# Patient Record
(demographics unavailable — no encounter records)

---

## 2021-10-15 NOTE — XRAY REPORT
PROCEDURE:  Chest 1 View X-Ray

 

INDICATIONS:  Chest pain

 

TECHNIQUE:  One view of the chest was acquired.  

 

COMPARISON:  None

 

FINDINGS:  

 

Surgical changes and devices:  None.  

 

Lungs and pleura:  No pleural effusions or pneumothorax.  Lungs are clear.  

 

Mediastinum:  Mediastinal contours appear normal.  Heart size is normal.  

 

Bones and chest wall:  No suspicious bony lesions.  Overlying soft tissues appear unremarkable.  

 

IMPRESSION:  

No acute cardiopulmonary pathology.

 

Reviewed by: Farhat Rose MD on 10/15/2021 2:05 PM PDT

Approved by: Farhta Rose MD on 10/15/2021 2:05 PM PDT

 

 

Station ID:  [id number]

## 2021-10-15 NOTE — ED PHYSICIAN DOCUMENTATION
PD HPI CHEST PAIN





- Stated complaint


Stated Complaint: chest px





- Chief complaint


Chief Complaint: Cardiac





- History obtained from


History obtained from: Patient





- History of Present Illness


Timing - onset: How many days ago ( 3 days ago)


Quality: Stabbing





- Additional information


Additional information: 





Pt is 32yo M presenting with chest pain. PMS significant for depression for 

which he takes prozac as well as recent ED evaluation at this facility for URI 

symptoms (9/26) Reports 3 days stabbing chest pain worse with deep inspiration. 

Denies fever, cough, hemoptysis, hospitalizations, travel, hisory of blood 

clots, illicit substance abuse.  





Review of Systems


Constitutional: denies: Fever, Fatigue


Eyes: denies: Loss of vision, Decreased vision


Ears: denies: Loss of hearing


Cardiac: reports: Chest pain / pressure


Respiratory: denies: Dyspnea, Cough, Hemoptysis, Wheezing


GI: denies: Abdominal Pain


Musculoskeletal: denies: Neck pain


Neurologic: denies: Generalized weakness


Psychiatric: reports: Depressed.  denies: Suicidal, Homicidal, Hallucinations, 

Delusions, Anxiety, Insomnia





PD PAST MEDICAL HISTORY





- Past Medical History


Psych: Depression





- Past Surgical History


Past Surgical History: No


HEENT: Tonsil/Adenoidectomy





- Present Medications


Home Medications: 


                                Ambulatory Orders











 Medication  Instructions  Recorded  Confirmed


 


Fluoxetine HCl [Prozac] 40 mg PO DAILY 09/26/21 09/26/21


 


Acetaminophen [Tylenol] 650 mg PO Q6H PRN #30 tablet 10/15/21 


 


Ibuprofen [Motrin] 1 tablet PO Q8H PRN #30 tablet 10/15/21 














- Allergies


Allergies/Adverse Reactions: 


                                    Allergies











Allergy/AdvReac Type Severity Reaction Status Date / Time


 


Penicillins Allergy  Rash Verified 10/15/21 13:36














- Social History


Does the pt smoke?: No


Smoking Status: Never smoker





PD ED PE NORMAL





- General


General: Alert and oriented X 3, No acute distress





- HEENT


HEENT: Atraumatic, EOMI





- Neck


Neck: Supple, no meningeal sign





- Cardiac


Cardiac: RRR, No murmur, No gallop, No rub, Strong equal pulses





- Respiratory


Respiratory: No respiratory distress, Clear bilaterally





- Abdomen


Abdomen: Normal bowel sounds, Non tender





Results





- Vitals


Vitals: 


                               Vital Signs - 24 hr











  10/15/21





  13:33


 


Temperature 36.4 C L


 


Heart Rate 103 H


 


Respiratory 18





Rate 


 


Blood Pressure 150/90 H


 


O2 Saturation 96








                                     Oxygen











O2 Source                      Room air

















- Labs


Labs: 


                                Laboratory Tests











  10/15/21 10/15/21 10/15/21





  14:05 14:05 14:05


 


WBC  9.8  


 


RBC  5.18  


 


Hgb  15.3  


 


Hct  44.6  


 


MCV  86.1  


 


MCH  29.5  


 


MCHC  34.3  


 


RDW  12.8  


 


Plt Count  229  


 


MPV  9.8  


 


Neut # (Auto)  6.9 H  


 


Lymph # (Auto)  1.7  


 


Mono # (Auto)  1.0  


 


Eos # (Auto)  0.1  


 


Baso # (Auto)  0.1  


 


Absolute Nucleated RBC  0.00  


 


Nucleated RBC %  0.0  


 


Sodium   139 


 


Potassium   4.1 


 


Chloride   102 


 


Carbon Dioxide   26 


 


Anion Gap   11.0 


 


BUN   21 H 


 


Creatinine   0.8 


 


Estimated GFR (MDRD)   111 


 


Glucose   83 


 


Calcium   9.3 


 


Total Bilirubin   0.8 


 


AST   22 


 


ALT   26 


 


Alkaline Phosphatase   41 L 


 


Troponin I High Sens    < 2.3 L


 


Total Protein   7.8 


 


Albumin   4.4 


 


Globulin   3.4 


 


Albumin/Globulin Ratio   1.3 


 


Lipase   31 














PD MEDICAL DECISION MAKING





- ED course


Complexity details: reviewed old records, reviewed results, re-evaluated 

patient, considered differential


ED course: 


Pt presented with 3 days of chest pain made worse with deep inspiration. A 

febrile, hemodynamically stable on arrivle. Vitals WNL and non toxic on exam, 

WELLS and PERK Negative. EKG without indication of acute cardiac ischemia. CXR 

non acute. Troponin negative. Pt re-evaluated and found to be resting 

comfortably and in no acute distress. Will discharge with course of Tylenol and 

ibuprofen. Encouraged followup with PCP. Otherwise clear return precautions and 

followup instructions given prior to discharge. 








Departure





- Departure


Disposition: 01 Home, Self Care


Clinical Impression: 


 Chest pain, Pleurisy





Condition: Good


Instructions:  ED Chest Pain NonCardiac, ED Chest Pain Pleurisy


Prescriptions: 


Ibuprofen [Motrin] 1 tablet PO Q8H PRN #30 tablet


 PRN Reason: PAIN &/OR FEVER


Acetaminophen [Tylenol] 650 mg PO Q6H PRN #30 tablet


 PRN Reason: PRN PAIN &/OR FEVER


Comments: 


Thank you for allowing us to care for you at [facility]. 





All of the testing performed today including your EKG, blood tests and chest 

xray were all very reassuring. I would like you to use alternating tylenol and 

ibuprofen at home for pain control. Please drink plenty of liquid and get 

pleanty of rest over the next few days. Please follow up with your PCP as soon 

as able. If at anytime you have any new or worsening symptoms please do not 

hesitate to return to the emergency department.

## 2021-11-22 NOTE — ED PHYSICIAN DOCUMENTATION
PD HPI FOCAL NEURO





- Stated complaint


Stated Complaint: LEFT HAND NUMBNESS





- Chief complaint


Chief Complaint: Neuro





- History obtained from


History obtained from: Patient





- History of Present Illness


Timing - onset: Enter  time (19:00), Today


Timing - details: Gradual onset


Severity of deficit: Mild


Weakness: Hand, Left


Numbness: Hand, Left


Associated symptoms: No: Headache, Nausea / vomiting, Seizure, Syncope, Fall, 

Head injury, Chest pain, Neck pain, Back pain, Fever


Contributing factors: negative: Anticoagulated, Vascular dz, Atrial 

fibrillation, Prosthetic heart valve


Baseline status: positive: A&OX3, ambulatory, indep


Similar symptoms before: Has not had sx before





- Additional information


Additional information: 





c/o gradual onset left hand paresthesias of thumb and 2nd finger, weakness 

predominantly of thumb. Onset approximately 7 PM tonight and increased to the 

point of having difficulty holding a book he was reading. The paresthesia/b

urning sensation has subsequently been radiating up to forearm and as high as 

axilla (left). denies h/o similar symptoms, denies neck pain. He is right hand 

dominant. he had an influenza vaccination earlier today. 





Review of Systems


Constitutional: reports: Reviewed and negative


Eyes: reports: Reviewed and negative


Cardiac: reports: Reviewed and negative


Respiratory: reports: Reviewed and negative


Skin: denies: Rash


Musculoskeletal: denies: Neck pain, Back pain, Extremity pain, Joint pain, 

Extremity swelling, Joint swelling


Neurologic: reports: Focal weakness (limited to thumb (left)), Numbness (paresth

esias but not doug numbness).  denies: Generalized weakness, Headache





PD PAST MEDICAL HISTORY





- Past Medical History


Past Medical History: No


Psych: Depression





- Past Surgical History


Past Surgical History: No


HEENT: Tonsil/Adenoidectomy





- Present Medications


Home Medications: 


                                Ambulatory Orders











 Medication  Instructions  Recorded  Confirmed


 


Fluoxetine HCl [Prozac] 40 mg PO DAILY 09/26/21 09/26/21


 


Acetaminophen [Tylenol] 650 mg PO Q6H PRN #30 tablet 10/15/21 


 


Ibuprofen [Motrin] 1 tablet PO Q8H PRN #30 tablet 10/15/21 














- Allergies


Allergies/Adverse Reactions: 


                                    Allergies











Allergy/AdvReac Type Severity Reaction Status Date / Time


 


Penicillins Allergy  Rash Verified 11/22/21 20:51














- Social History


Does the pt smoke?: No


Smoking Status: Never smoker





PD ED PE NORMAL





- Vitals


Vital signs reviewed: Yes





- General


General: Alert and oriented X 3, No acute distress, Well developed/nourished





- HEENT


HEENT: PERRL, EOMI





- Derm


Derm: Normal color, Warm and dry, No rash





- Extremities


Extremities: No deformity, No tenderness to palpate, Normal ROM s pain, No edema





- Neuro


Neuro: Alert and oriented X 3, No sensory deficit, Other (weakness of left thumb

 extension (3/5) but 5/5 flexion and 4/5 abduction. no atrophy noted left hand)





NIHSS





- Level of Consciousness


Level of consciousness: (0) Alert, Keenly responsive


LOC Questions: (0) Answers both Q's correct


LOC Commands: (0) Performs both correctly





- Gaze


Best Gaze: (0) Normal





- Visual


Visual: (0) No loss





- Facial Palsy


Facial Palsy: (0) Normal, symmetrical movement





- Motor Arms (both separate)


Motor Arm (right): (0) No drift


Motor Arm (left): (0) No drift





- Motor Legs (both separate)


Motor Leg (right): (0) No drift


Motor Leg (left): (0) No drift





- Limb Ataxia


Limb Ataxia: (0) Absent





- Sensory


Sensory: (0) Normal





- Best Language


Best Language: (0) No aphasia





- Dysarthria


Dysarthria: (0) Normal





- Extinction and Inattention (formally neg


Extinction and inattention: (0) No abnormality





- Total Score/Results


Total Score/Result: 0





Results





- Vitals


Vitals: 


                                     Oxygen











O2 Source                      Room air

















- Labs


Labs: 


                                Laboratory Tests











  11/22/21 11/22/21





  21:20 21:20


 


WBC  9.8 


 


RBC  5.39 


 


Hgb  15.9 


 


Hct  46.9 


 


MCV  87.0 


 


MCH  29.5 


 


MCHC  33.9 


 


RDW  13.0 


 


Plt Count  205 


 


MPV  9.7 


 


Neut # (Auto)  6.3 


 


Lymph # (Auto)  2.4 


 


Mono # (Auto)  0.9 


 


Eos # (Auto)  0.2 


 


Baso # (Auto)  0.1 


 


Absolute Nucleated RBC  0.00 


 


Nucleated RBC %  0.0 


 


Sodium   139


 


Potassium   4.1


 


Chloride   100 L


 


Carbon Dioxide   30


 


Anion Gap   9.0


 


BUN   25 H


 


Creatinine   0.8


 


Estimated GFR (MDRD)   111


 


Glucose   92


 


Calcium   9.6














- Rads (name of study)


  ** CTA head


Radiology: Prelim report reviewed, See rad report





  ** CTA neck


Radiology: Prelim report reviewed, See rad report





PD MEDICAL DECISION MAKING





- ED course


Complexity details: reviewed results, re-evaluated patient, considered 

differential, d/w patient


ED course: 





presents with left hand paresthesias and weakness. He indicates roughly equal 

LTS on hands on NIHSS exam. He does exhibit weakness of thumb extension though 

not with abduction. His NIHSS score is zero (no weakness as indicated by lack of

 drift on extremity exams). he also indicates episodic cramping sensation in 

1st and 2nd webspaces, as well as difficulty holding a book due to paresthesias 

and thumb weakness. He also describes paresthesia and mild burning sensation 

traveling up from hand to elbow and as high as axilla. He received a flu shot 

earlier today which is likely coincident (asymetric nature of c/o makes GBS 

unlikely). His symptoms are predominantly limited to median nerve distribution ;

 I suspect median or radial nerve palsy, possibly entrapment such as carpal 

tunnel syndrome. His tests tonight are unremarkable including CTA head and neck.

 Reviewed test results with patient as well as my suspected diagnoses. He will 

likely need further testing in outpatient setting unless symptoms resolve, and 

instructed to return if worse or new and concerning signs/symptoms develop. 





Departure





- Departure


Disposition: 01 Home, Self Care


Clinical Impression: 


 Paresthesia





Condition: Good


Instructions:  ED Carpal Tunnel, ED Paraesthesias


Follow-Up: 


SILVIA ROMO DO [Primary Care Provider] - 


Comments: 


I am providing you with discharge instructions on carpal tunnel syndrome because

your symptoms have some similarities to those that would be typical for carpal 

tunnel syndrome. However, there are other elements in your description that 

would be atypical, and thus this is not a final diagnosis. Follow up with your 

primary care provider; further testing and specialist referral (such as a 

neurologist) might be needed to achieve a definitive diagnosis.


Discharge Date/Time: 11/22/21 22:58

## 2021-11-22 NOTE — CT REPORT
PROCEDURE:  ANGIO HEAD W/WO

 

INDICATIONS:  left hand weakness, paresthesias

 

CONTRAST:  IV CONTRAST: Optiray 320 ml: 80 PO CONTRAST: *NO PO CONTRAST 

 

TECHNIQUE:  

Precontrast 4.5 mm thick angled axial sections acquired from the foramen magnum to the vertex.   Afte
r the administration of intravenous contrast, 1 mm thick sections acquired through the [place]
is.  Postcontrast 4.5 mm thick sections then re-acquired from the foramen magnum to the vertex.  3-di
mensional maximum-intensity-projection (MIP) and/or volume rendering reformats were acquired of the c
entral intracranial vasculature.  For radiation dose reduction, the following was used:  automated ex
posure control, adjustment of mA and/or kV according to patient size.

 

COMPARISON:  None

 

FINDINGS:  

Image quality:  Excellent.  

 

Anterior circulation:  Intracranial internal carotid arteries are normal in size and flow.  The flow 
within the paired anterior cerebral arteries is normal and symmetric.  The flow within the middle cer
ebral arteries is normal and symmetric.  The anterior communicating artery is seen.  No aneurysms are
 seen.  

 

Posterior circulation:  Visualized portions of the vertebral arteries demonstrate normal caliber, and
 join to form a normal appearing basilar artery. Near fetal origin of the right posterior cerebral ar
lupe. Flow within the posterior cerebral arteries is normal and symmetric.  No aneurysms are seen.  

 

CSF spaces:  Ventricles are normal in size and shape.  Basal cisterns are patent.  No extra-axial flu
id collections.  

 

Brain:  No midline shift.  No intracranial bleeds or masses.  Gray-white matter interface appears int
act.  

 

Skull and face:  Calvarium and facial bones appear intact, without suspicious lesions.  

 

Sinuses:  Visualized sinuses and mastoids are clear.  

 

IMPRESSION:  

Negative cerebral MR angiography.

 

Reviewed by: Elías Stover MD on 11/22/2021 10:21 PM PST

Approved by: Elías Stover MD on 11/22/2021 10:21 PM PST

 

 

Station ID:  [id number]

## 2021-11-22 NOTE — CT REPORT
PROCEDURE:  ANGIO NECK W

 

INDICATIONS:  left hand weakness, paresthesias

 

CONTRAST:  IV CONTRAST: Optiray 320 ml: 80 PO CONTRAST: *NO PO CONTRAST 

 

TECHNIQUE:  

After the administration of intravenous contrast, 1.5 mm axial sections acquired from the aortic arch
 to the [place].  Coronal 3-D maximum intensity projection (MIP) and/or volume rendering ref
ormats were then performed.  For radiation dose reduction, the following was used:  automated exposur
e control, adjustment of mA and/or kV according to patient size.

 

COMPARISON:  None.

 

FINDINGS:  

Image quality:  Excellent.  

 

Carotid system:  The great vessels demonstrate a conventional anatomy as they arise from the aortic a
rch.  The origins of the common carotid arteries appear patent.  The common carotid arteries demonstr
ate normal calibers and courses.  The bifurcation regions appear normal bilaterally.  The internal ca
rotid arteries demonstrate normal caliber and course.  

 

Posterior circulation:  The origins of the vertebral arteries appear patent.  The more superior porti
ons of the vertebral arteries demonstrate normal course and caliber.  They join to form a normal appe
aring basilar artery.  

 

Soft tissues:  Visualized neck soft tissues demonstrate no suspicious abnormalities.  The thyroid is 
normal in size and there are no incidental findings.

 

Bones:  No suspicious bony lesions.  Visualized cervical spine appears normally aligned.   

 

IMPRESSION:  

Negative CT angiography of the neck.

 

The estimate of stenosis included in the report of the imaging study was calculated using the NASCET 
method

 

 

 

CLINICAL RECOMMENDATION STATEMENTS:

In patients <35 years with an ITN detected on CT, MRI, or extrathyroidal ultrasound, the Committee re
commends further evaluation with dedicated thyroid ultrasound if the nodule is "e1 cm and has no susp
icious imaging features, and if the patient has normal life expectancy.

In patients "e35 years with an ITN detected on CT, MRI, or extrathyroidal ultrasound, the Committee r
ecommends further evaluation with dedicated thyroid ultrasound if the nodule is "e1.5 cm and has no s
uspicious imaging features, and if the patient has normal life expectancy. (ACR, 2014)

 

Reviewed by: Elías Stover MD on 11/22/2021 10:23 PM PST

Approved by: Elías Stover MD on 11/22/2021 10:23 PM PST

 

 

Station ID:  [id number]

## 2022-04-27 NOTE — ED PHYSICIAN DOCUMENTATION
PD HPI URI





- Stated complaint


Stated Complaint: SORE THROAT/FATIGUE/LIGHT HEADED





- Chief complaint


Chief Complaint: Heent





- History obtained from


History obtained from: Patient





- Additional information


Additional information: 


Patient is a 33-year-old male with a sore throat for 2 days and a feeling 

generalized malaise.  He reports having chills but no fevers.  He has a dry 

cough.  His wife and kids were sick last week with similar symptoms.  He is 

vaccinated for COVID.  His family took COVID test last week which were negative 

the patient has not taken 1.  He was sent home from work today. He has not taken

anything for his symptoms.  He is able to ambulate without difficulty.  He 

denies chest pain, difficulty breathing, abdominal pain, vomiting, diarrhea.








Review of Systems


Constitutional: reports: Chills.  denies: Fever


Nose: denies: Congestion


Throat: reports: Sore throat


Cardiac: denies: Chest pain / pressure, Palpitations


Respiratory: denies: Dyspnea


GI: denies: Abdominal Pain, Vomiting


Skin: denies: Rash


Musculoskeletal: denies: Back pain


Neurologic: denies: Headache





PD PAST MEDICAL HISTORY





- Past Medical History


Past Medical History: Yes


Psych: Depression





- Past Surgical History


Past Surgical History: Yes


HEENT: Tonsil/Adenoidectomy





- Present Medications


Home Medications: 


                                Ambulatory Orders











 Medication  Instructions  Recorded  Confirmed


 


Propranolol [Inderal] 10 mg PO DAILY 04/27/22 04/27/22














- Allergies


Allergies/Adverse Reactions: 


                                    Allergies











Allergy/AdvReac Type Severity Reaction Status Date / Time


 


Penicillins Allergy  Rash Verified 04/27/22 21:30














- Social History


Does the pt smoke?: No


Smoking Status: Never smoker


Does the pt drink ETOH?: Yes


Does the pt have substance abuse?: No





- Immunizations


Immunizations are current?: Yes





- POLST


Patient has POLST: No





PD ED PE NORMAL





- General


General: Alert and oriented X 3, No acute distress, Well developed/nourished





- HEENT


HEENT: Atraumatic, PERRL, EOMI, Moist mucous membranes, Pharynx benign (No 

exudate, no swelling, Normal speech,No trismus)





- Neck


Neck: Supple, no meningeal sign





- Cardiac


Cardiac: RRR, No murmur, Strong equal pulses





- Respiratory


Respiratory: No respiratory distress, Clear bilaterally





- Derm


Derm: Normal color





- Extremities


Extremities: No edema





- Neuro


Neuro: Alert and oriented X 3, No motor deficit, Normal speech





- Psych


Psych: Normal mood, Normal affect





Results





- Vitals


Vitals: 


                               Vital Signs - 24 hr











  04/27/22





  21:24


 


Temperature 36.2 C L


 


Heart Rate 96


 


Respiratory 16





Rate 


 


Blood Pressure 128/85 H


 


O2 Saturation 99








                                     Oxygen











O2 Source                      Room air

















- Labs


Labs: 


                                Laboratory Tests











  04/27/22





  21:28


 


Group A Strep Rapid  Negative














PD MEDICAL DECISION MAKING





- ED course


Complexity details: reviewed results, d/w patient


ED course: 


Patient with sore throat for 2 days.  No signs of airway compromise or oral 

abscess.Vital signs are stable and patient is ambulatory.  Strep test is 

negative.  COVID swab obtained and is pending at time of discharge.  Patient was

 given dose of Decadron for likely viral pharyngitis.  Patient is tolerating 

p.o. and is nontoxic in appearance.  Discussed continuing with supportive care 

and need for follow-up.  Patient is aware of return precautions.








Departure





- Departure


Disposition: 01 Home, Self Care


Clinical Impression: 


 Viral pharyngitis





Condition: Stable


Instructions:  ED Pharyngitis Viral


Comments: 


Fidencio you were evaluated for a sore throat today.  Your strep test is negative

 and a culture is pending.  You also had a COVID test and the result of that is 

pending.  You did receive a dose of a steroid to help with inflammation.  Please

 continue to use Motrin or Tylenol as needed for pains or fever.  Please 

continue to hydrate and get plenty of rest.





Return to the emergency department with worsening symptoms such as trouble 

breathing, trouble swallowing, Productive cough, Chest pain, weakness or with 

any concerns.





You have a Covid test pending. You need to self quarantine until the result is 

done and negative. Do not leave your house. Do not get near anybody. The results

 should be done in 48 to 72 hours. We will call with a positive result, the 

fastest way to get a negative result for confirmation though is to go to the 

hospital website at [email or web address], click on the my [facility] tab and

 sign up for the patient portal.





If any friends or family get sick and would like to have a Covid test done, but 

do not have signs or symptoms that would necessitate being hospitalized, there 

are multiple local options for Covid testing. [facility] keeps 

an updated list of testing and vaccination options at: 

[email or web address].


Forms:  Activity restrictions


Discharge Date/Time: 04/27/22 22:09

## 2022-05-11 NOTE — XRAY REPORT
PROCEDURE:  Knee 2 View LT

 

INDICATIONS:  L KNEE PX

 

TECHNIQUE:  2 views of the left knee(s) were acquired.  

 

COMPARISON:  None.

 

FINDINGS:  

 

Bones:  No fractures or dislocations.  No suspicious bony lesions.  Chondrocalcinosis. Minimal osteop
hytes.

 

Soft tissues: Tiny joint effusion.  No suspicious soft tissue calcifications.  

 

IMPRESSION:  Mild changes of CPPD arthropathy.

No evidence acute bony abnormality of the left knee.

 

If clinical suspicion and/or symptoms persist, further assessment with repeat plain films or advanced
 imaging (e.g., CT, MRI, or bone scan) may be helpful for further assessment. 

 

Reviewed by: Tarik Narayan MD on 5/11/2022 12:32 PM PDT

Approved by: Tarik Narayan MD on 5/11/2022 12:32 PM PDT

 

 

Station ID:  [id number]

## 2022-05-23 NOTE — ED PHYSICIAN DOCUMENTATION
PD HPI BACK PAIN





- Stated complaint


Stated Complaint: LOW BACK SPASMS/RT LEG NUMBNESS





- Chief complaint


Chief Complaint: Back Pain





- History obtained from


History obtained from: Patient





- History of Present Illness


Timing - onset: How many weeks ago (2)


Timing - details: Gradual onset, Waxing and waning


Location: Right


Associated symptoms: Numbness (paresthesias right shin, chronic).  No: Fever, 

Weakness, Incontinent of urine, Unable to urinate, Hematuria, Incontinent of 

stool


Worsened by: Movement


Similar symptoms before: Diagnosis (herniated disc, per patient)


Recently seen: Not recently seen





- Additional information


Additional information: 





c/o low back pain, predominantly right-sided with radiation down RLE, from a 

herniated disc (per patient). Patient says he has had similar back pain 

episodically for years, attributed to herniated disc. He says the pain has been 

episodic but steadily worsening over past 2 weeks. Pain is distinctly worse with

movement involving lower back. Denies injury. 





Review of Systems


Constitutional: reports: Reviewed and negative


: denies: Unable to Void, Incontinent


Musculoskeletal: reports: Back pain.  denies: Extremity swelling, Pain with 

weight bearing


Neurologic: reports: Numbness (mild right pre-tibial paresthesia, chronic).  

denies: Focal weakness





PD PAST MEDICAL HISTORY





- Past Medical History


Past Medical History: Yes


Psych: Depression





- Past Surgical History


Past Surgical History: Yes


HEENT: Tonsil/Adenoidectomy





- Present Medications


Home Medications: 


                                Ambulatory Orders











 Medication  Instructions  Recorded  Confirmed


 


Propranolol [Inderal] 10 mg PO DAILY 04/27/22 04/27/22


 


Cyclobenzaprine [Flexeril] 10 mg PO TID PRN #20 tablet 05/24/22 


 


Oxycodone HCl/Acetaminophen 1 - 2 each PO Q6H PRN #20 tablet 05/24/22 





[Percocet 5-325 mg Tablet]   














- Allergies


Allergies/Adverse Reactions: 


                                    Allergies











Allergy/AdvReac Type Severity Reaction Status Date / Time


 


Penicillins Allergy  Rash Verified 05/23/22 22:15














- Social History


Does the pt smoke?: No


Smoking Status: Never smoker


Does the pt drink ETOH?: Yes


Does the pt have substance abuse?: No





- Immunizations


Immunizations are current?: Yes





- POLST


Patient has POLST: No





PD ED PE NORMAL





- Vitals


Vital signs reviewed: Yes





- General


General: Alert and oriented X 3, No acute distress, Well developed/nourished





- Derm


Derm: Normal color, Warm and dry, No rash





- Extremities


Extremities: No edema





- Neuro


Neuro: Alert and oriented X 3, No motor deficit (5/5 bilateral 

dorsi/plantarflexion. LTS intact BLE), No sensory deficit





Results





- Vitals


Vitals: 


                                     Oxygen











O2 Source                      Room air

















PD MEDICAL DECISION MAKING





- ED course


Complexity details: considered differential, d/w patient


ED course: 


Patient c/o exacerbation of chronic LBP radiating down RLE . He says this is not

a new problem for him, just exacerbation of symptoms that have been attributed 

to lumbar disc herniation. He has no red flags such as fever, weakness, 

numbness (except right pre-tibial paresthesias which he says is a chronic 

symptom). He is given take-home percocet and rx for percocet and flexeril e-

prescribed to his pharmacy of choice.


I am prescribing a short course of short-acting opioid pain medication for this 

patient. I have reviewed the patients  and no concerning findings were 

noted. I have discussed that the opioids are for short term therapy only, and 

will not be refilled from the ED





Departure





- Departure


Disposition: 01 Home, Self Care


Clinical Impression: 


Low back pain


Qualifiers:


 Chronicity: acute Back pain laterality: bilateral Sciatica presence: with 

sciatica Sciatica laterality: sciatica of right side Qualified Code(s): M54.41 -

Lumbago with sciatica, right side





Condition: Good


Instructions:  ED Sciatica


Follow-Up: 


[facility] [Provider Group]


Prescriptions: 


Cyclobenzaprine [Flexeril] 10 mg PO TID PRN #20 tablet


 PRN Reason: Spasms


Oxycodone HCl/Acetaminophen [Percocet 5-325 mg Tablet] 1 - 2 each PO Q6H PRN #20

tablet


 PRN Reason: pain


Comments: 


Prescriptions for cyclobenzaprine (muscle relaxer) and oxycodone/acetaminophen 

(percocet; narcotic-strength pain medication) have been electronically submitted

to [facility] pharmacy in [place].


Contact your primary care provider in the morning to arrange for next available 

appointment. 





I am prescribing a short course of narcotic pain medication for you. These are 

potentially dangerous and addictive medications that should be used carefully. 


 These medications may constipate you. Take an over-the-counter stool softener 

(docusate) twice daily with plenty of water while taking these medications. If 

you go 24 hours without a bowel movement, take over-the-counter miralax, per sudheer simth instructions.


 Do not drink or drive while taking these medications. 


 If you received narcotic or sedating medications while in the emergency 

department, do not drive for 24 hours.


 Store this medication in a safe, secure place and out of reach of children. 


 It is a violation of federal law to give or sell this medication to another 

person or to use in a manner other than prescribed.


 The ED will not refill narcotic prescriptions, including prescriptions lost or 

stolen.


 To dispose of unwanted medications:


 1. [facility] at [address] in 

[place] has a medication drop box. They accept prescription medications (in 

pill form) Monday through Friday 9:00 a.m. to 5:00 p.m. 


 2. The [facility] [employer] accepts prescription medications 

(in pill form only) for disposal year round. Call [phone] for more 

information. 


 3. Contact the [facility] for the next Count includes the [facility] sponsored prescription 

drug collection event. [phone], [phone], or [phone] 

[phone];


Forms:  Activity restrictions


Discharge Date/Time: 05/24/22 00:18

## 2022-08-23 NOTE — ED PHYSICIAN DOCUMENTATION
PD HPI BACK PAIN





- Stated complaint


Stated Complaint: LOWER BACK PAIN





- Chief complaint


Chief Complaint: Back Pain





- History obtained from


History obtained from: Patient





- History of Present Illness


Timing - details: Gradual onset, Waxing and waning


Location: Lower, Right


Quality: Pain, Spasm


Associated symptoms: No: Fever, Weakness, Numbness, Incontinent of urine, Unable

to urinate, Hematuria, Incontinent of stool


Improves with: Rest


Worsened by: Movement


Similar symptoms before: Diagnosis (sciatica, herniated discs)


Recently seen: Not recently seen





- Additional information


Additional information: 





c/o 3 days of gradual onset, gradually worsening right lower back pain that 

radiates down RLE. Patient says "I have some herniated discs and I'm having a 

flare up". He says he has had epidurals for this problem, both recently as well 

as upcoming/scheduled. No recent injury. 





Review of Systems


Constitutional: denies: Fever


GI: denies: Abdominal Pain


: denies: Unable to Void, Incontinent


Musculoskeletal: reports: Back pain.  denies: Extremity swelling


Neurologic: denies: Focal weakness, Numbness





PD PAST MEDICAL HISTORY





- Past Medical History


Psych: Depression





- Past Surgical History


Past Surgical History: Yes


HEENT: Tonsil/Adenoidectomy





- Present Medications


Home Medications: 


                                Ambulatory Orders











 Medication  Instructions  Recorded  Confirmed


 


Propranolol [Inderal] 10 mg PO DAILY 04/27/22 04/27/22


 


Cyclobenzaprine [Flexeril] 10 mg PO TID PRN #20 tablet 05/24/22 


 


Oxycodone HCl/Acetaminophen 1 - 2 each PO Q6H PRN #20 tablet 05/24/22 





[Percocet 5-325 mg Tablet]   


 


Cyclobenzaprine [Flexeril] 10 mg PO TID PRN #20 tablet 08/23/22 


 


Oxycodone HCl/Acetaminophen 1 - 2 each PO Q6H PRN #14 tablet 08/23/22 





[Percocet 5-325 mg Tablet]   














- Allergies


Allergies/Adverse Reactions: 


                                    Allergies











Allergy/AdvReac Type Severity Reaction Status Date / Time


 


Penicillins Allergy  Rash Verified 08/23/22 20:34














- Social History


Does the pt smoke?: No


Smoking Status: Never smoker


Does the pt drink ETOH?: Yes


Does the pt have substance abuse?: No





- Immunizations


Immunizations are current?: Yes





- POLST


Patient has POLST: No





PD ED PE NORMAL





- Vitals


Vital signs reviewed: Yes





- General


General: Alert and oriented X 3, Well developed/nourished, Other (appears 

uncomfortable)





- Back


Back: No CVA TTP, No spinal TTP





- Derm


Derm: No rash





- Extremities


Extremities: Normal ROM s pain, No edema





- Neuro


Neuro: No motor deficit, No sensory deficit





Results





- Vitals


Vitals: 


                                     Oxygen











O2 Source                      Room air

















PD MEDICAL DECISION MAKING





- ED course


Complexity details: reviewed old records, re-evaluated patient, considered 

differential, d/w patient


ED course: 


Presents with exacerbation of recurrent/chronic low back pain/sciatica. He is 

given 2mg IM dilaudid, and take-home packs of percocet and flexeril with 

prescriptions for percocet and flexeril sent to his pharmacy of choice.








I am prescribing a short course of short-acting opioid pain medication for this 

patient. I have reviewed the patients  and no concerning findings were 

noted. I have discussed that the opioids are for short term therapy only, and 

will not be refilled from the ED





Departure





- Departure


Disposition: 01 Home, Self Care


Clinical Impression: 


Sciatica


Qualifiers:


 Laterality: right Qualified Code(s): M54.31 - Sciatica, right side





Condition: Good


Instructions:  ED Sciatica


Follow-Up: 


SHIRLEY STALLINGS MD [Primary Care Provider] - 


Prescriptions: 


Cyclobenzaprine [Flexeril] 10 mg PO TID PRN #20 tablet


 PRN Reason: Spasms


Oxycodone HCl/Acetaminophen [Percocet 5-325 mg Tablet] 1 - 2 each PO Q6H PRN #14

tablet


 PRN Reason: pain


Comments: 





Prescriptions for percocet and cyclobenzaprine have been electronically 

submitted to [facility] pharmacy in [place]. 





I am prescribing a short course of narcotic pain medication for you. These are 

potentially dangerous and addictive medications that should be used carefully. 


 These medications may constipate you. Take an over-the-counter stool softener 

(docusate) twice daily with plenty of water while taking these medications. If 

you go 24 hours without a bowel movement, take over-the-counter miralax, per 

package instructions.


 Do not drink or drive while taking these medications. 


 If you received narcotic or sedating medications while in the emergency 

department, do not drive for 24 hours.


 Store this medication in a safe, secure place and out of reach of children. 


 It is a violation of federal law to give or sell this medication to another 

person or to use in a manner other than prescribed.


 The ED will not refill narcotic prescriptions, including prescriptions lost or 

stolen.


 To dispose of unwanted medications:


 1. [facility] at [address] in 

[place] has a medication drop box. They accept prescription medications (in 

pill form) Monday through Friday 9:00 a.m. to 5:00 p.m. 


 2. The [facility] [employer] accepts prescription medications 

(in pill form only) for disposal year round. Call [phone] for more 

information. 


 3. Contact the [facility] for the next [facility] sponsored prescription 

drug collection event. [phone], [phone], or [phone] 

[phone];


Discharge Date/Time: 08/23/22 23:20